# Patient Record
Sex: FEMALE | Race: WHITE | NOT HISPANIC OR LATINO | Employment: PART TIME | URBAN - METROPOLITAN AREA
[De-identification: names, ages, dates, MRNs, and addresses within clinical notes are randomized per-mention and may not be internally consistent; named-entity substitution may affect disease eponyms.]

---

## 2021-02-19 LAB
EXTERNAL HIV CONFIRMATION: NORMAL
EXTERNAL HIV SCREEN: NORMAL
HCV AB SER-ACNC: NEGATIVE

## 2021-02-23 ENCOUNTER — TRANSCRIBE ORDERS (OUTPATIENT)
Dept: PERINATAL CARE | Facility: CLINIC | Age: 21
End: 2021-02-23

## 2021-02-23 DIAGNOSIS — O09.899 SUPERVISION OF OTHER HIGH RISK PREGNANCIES, UNSPECIFIED TRIMESTER: Primary | ICD-10-CM

## 2021-03-26 RX ORDER — LEVOTHYROXINE SODIUM 0.03 MG/1
13 TABLET ORAL DAILY
COMMUNITY

## 2021-03-26 RX ORDER — ONDANSETRON 4 MG/1
4 TABLET, FILM COATED ORAL EVERY 8 HOURS PRN
COMMUNITY
End: 2021-08-30

## 2021-03-31 ENCOUNTER — ROUTINE PRENATAL (OUTPATIENT)
Dept: PERINATAL CARE | Facility: CLINIC | Age: 21
End: 2021-03-31
Payer: COMMERCIAL

## 2021-03-31 VITALS
HEART RATE: 97 BPM | BODY MASS INDEX: 19.21 KG/M2 | SYSTOLIC BLOOD PRESSURE: 117 MMHG | DIASTOLIC BLOOD PRESSURE: 73 MMHG | HEIGHT: 62 IN | WEIGHT: 104.4 LBS

## 2021-03-31 DIAGNOSIS — Z36.82 NUCHAL TRANSLUCENCY OF FETUS ON PRENATAL ULTRASOUND: Primary | ICD-10-CM

## 2021-03-31 DIAGNOSIS — O99.611 GASTROESOPHAGEAL REFLUX IN PREGNANCY IN FIRST TRIMESTER: ICD-10-CM

## 2021-03-31 DIAGNOSIS — K21.9 GASTROESOPHAGEAL REFLUX IN PREGNANCY IN FIRST TRIMESTER: ICD-10-CM

## 2021-03-31 DIAGNOSIS — E03.8 SUBCLINICAL HYPOTHYROIDISM: ICD-10-CM

## 2021-03-31 DIAGNOSIS — Z3A.11 11 WEEKS GESTATION OF PREGNANCY: ICD-10-CM

## 2021-03-31 DIAGNOSIS — O09.899 SUPERVISION OF OTHER HIGH RISK PREGNANCIES, UNSPECIFIED TRIMESTER: ICD-10-CM

## 2021-03-31 PROBLEM — E03.9 HYPOTHYROIDISM: Status: ACTIVE | Noted: 2021-03-31

## 2021-03-31 PROBLEM — E28.2 POLYCYSTIC OVARIES: Status: ACTIVE | Noted: 2021-03-31

## 2021-03-31 PROCEDURE — 76813 OB US NUCHAL MEAS 1 GEST: CPT | Performed by: OBSTETRICS & GYNECOLOGY

## 2021-03-31 PROCEDURE — 99241 PR OFFICE CONSULTATION NEW/ESTAB PATIENT 15 MIN: CPT | Performed by: OBSTETRICS & GYNECOLOGY

## 2021-03-31 RX ORDER — FAMOTIDINE 20 MG/1
20 TABLET, FILM COATED ORAL 2 TIMES DAILY
Qty: 60 TABLET | Refills: 6 | Status: SHIPPED | OUTPATIENT
Start: 2021-03-31 | End: 2021-08-30

## 2021-03-31 NOTE — LETTER
2021     Tammi Olmedo, [address]  [address]  [address] [address] [address]    Patient: Emelyn Ryan   YOB: 2000   Date of Visit: 3/31/2021       Dear Dr Daniel Snow: Thank you for referring Emelyn Ryan to me for evaluation  Below are my notes for this consultation  If you have questions, please do not hesitate to call me  I look forward to following your patient along with you  Sincerely,        Laney Jimenez MD        CC: No Recipients  Laney Jimenez MD  3/31/2021  7:21 PM  Sign when Signing Visit  OFFICE CONSULT  Referring physician:   Tammi Olmedo, [address] [place] [address]  [address],  [address]      Dear Tammi Olmedo      Thank you for requesting a  consultation on your patient Ms Emelyn Ryan for the following indications:  Genetic screening    History  Medications: Levothyroxine 25 micro grams daily, Zofran 4 milligrams p r n , prenatal vitamins daily  Allergies to medications:  Sulfa antibiotics- she reports this because her brother has a severe allergy so she has stayed away from it  Past medical history:  Maternal depression controlled with intermittent counseling with a therapist, PCOS, subclinical hypothyroidism diagnosed by PCP in very early pregnancy  TSH 4 8  with normal free T4 of 1 48 on 21  Symptoms of reflux that are causing significant nausea and at times vomiting   Past surgical history:  [place] tooth extraction  Past obstetrical history:   1 para 0  Social history:  Denies use of cigarettes, alcohol or illicit drugs  First generation family history:  History of hypothyroidism in her mom her dad, 2 sisters and a brother  Her sister has a son with Down syndrome  Ultrasound findings: The ultrasound shows a fetus concordant with dates  The nasal bone and nuchal translucency appears normal  No malformations are seen on today's early ultrasound       The patient was informed of the findings and counseled about the limitations of the exam in detecting all forms of fetal congenital abnormalities  She does not report any vaginal bleeding or uterine cramping or contractions  Specific counseling was provided on the following problems:  1  We discussed the options for genetic screening which include invasive testing on the fetal placenta or on fetal skin cells within the amniotic fluid and compared this to noninvasive testing which includes cell free DNA screening and the sequential screen and quad screen  We reviewed the risks, the benefits and the limitations of each  In the end patient patient took her pamphlets home for both the sequential screen and cell free DNA screening and will talk with her  to decide further if they want any fetal testing  She is aware that she has 2 weeks to make a decision about the sequential screen based on the fetal size today  The quad screen can be done between 15 weeks and 21 weeks and 6 days and the cell free DNA screen can be done any time  2   In the first trimester, the normal range for TSH level is 0 1 to 2 5 mIU/L; this level increases to 0 2 to 3 0 mIU/L in the second trimester and 0 3 to 3 0 mIU/L in the third trimester  Her TSH levels should be rechecked with each trimester and then her meds titrated to keep her TSH in the normal range for pregnancy  Concurrent use of iron or calcium with her thyroid meds may result in decreased effectiveness of her meds  3  Recommend she start Pepcid 20 milligrams twice a day  Her 1st dose can be taken 30 minutes before dinner  Her 2nd dose is normally taken 30 minutes before breakfast but this will affect her levothyroxine dose so instead I would take it at approximately 10-11 am  Her symptoms she states are worse overnight and 1st thing in the morning so she may only a before dinner dose    4  Patient reports she thinks her due date is October 10th which would be by her US done on 2/17/21 based on a gestational sac  Recommend using instead her last menstrual period which is concordant with her 1st ultrasound in which the fetus was seen which occurred on 03/08 2021 and gives the same due date as her LMP  She reports that despite her diagnosis of PCOS that for the last 6 months she has had regular 28 day cycles  Future ultrasounds ordered today:   1  Fetal Level II ultrasound imaging is recommended at 19-20 weeks' gestation  If normal a follow-up ultrasound at 32 weeks will be scheduled        The face to face time, in addition to time spent discussing ultrasound results, was approximately 15 minutes, greater than 50% of which was spent during counseling and coordination of care    Sherrie Coleman MD Midline

## 2021-03-31 NOTE — PROGRESS NOTES
OFFICE CONSULT  Referring physician:   Jarrell Spencer, [address] [place] [address]  [address]  [place],  [address]      Dear Jarrell Spencer      Thank you for requesting a  consultation on your patient Ms Uri Dickson for the following indications:  Genetic screening    History  Medications: Levothyroxine 25 micro grams daily, Zofran 4 milligrams p r n , prenatal vitamins daily  Allergies to medications:  Sulfa antibiotics- she reports this because her brother has a severe allergy so she has stayed away from it  Past medical history:  Maternal depression controlled with intermittent counseling with a therapist, PCOS, subclinical hypothyroidism diagnosed by PCP in very early pregnancy  TSH 4 8  with normal free T4 of 1 48 on 21  Symptoms of reflux that are causing significant nausea and at times vomiting   Past surgical history:  [place] tooth extraction  Past obstetrical history:   1 para 0  Social history:  Denies use of cigarettes, alcohol or illicit drugs  First generation family history:  History of hypothyroidism in her mom her dad, 2 sisters and a brother  Her sister has a son with Down syndrome  Ultrasound findings: The ultrasound shows a fetus concordant with dates  The nasal bone and nuchal translucency appears normal  No malformations are seen on today's early ultrasound  The patient was informed of the findings and counseled about the limitations of the exam in detecting all forms of fetal congenital abnormalities  She does not report any vaginal bleeding or uterine cramping or contractions  Specific counseling was provided on the following problems:  1  We discussed the options for genetic screening which include invasive testing on the fetal placenta or on fetal skin cells within the amniotic fluid and compared this to noninvasive testing which includes cell free DNA screening and the sequential screen and quad screen    We reviewed the risks, the benefits and the limitations of each  In the end patient patient took her pamphlets home for both the sequential screen and cell free DNA screening and will talk with her  to decide further if they want any fetal testing  She is aware that she has 2 weeks to make a decision about the sequential screen based on the fetal size today  The quad screen can be done between 15 weeks and 21 weeks and 6 days and the cell free DNA screen can be done any time  2   In the first trimester, the normal range for TSH level is 0 1 to 2 5 mIU/L; this level increases to 0 2 to 3 0 mIU/L in the second trimester and 0 3 to 3 0 mIU/L in the third trimester  Her TSH levels should be rechecked with each trimester and then her meds titrated to keep her TSH in the normal range for pregnancy  Concurrent use of iron or calcium with her thyroid meds may result in decreased effectiveness of her meds  3  Recommend she start Pepcid 20 milligrams twice a day  Her 1st dose can be taken 30 minutes before dinner  Her 2nd dose is normally taken 30 minutes before breakfast but this will affect her levothyroxine dose so instead I would take it at approximately 10-11 am  Her symptoms she states are worse overnight and 1st thing in the morning so she may only a before dinner dose  4  Patient reports she thinks her due date is October 10th which would be by her US done on 2/17/21 based on a gestational sac  Recommend using instead her last menstrual period which is concordant with her 1st ultrasound in which the fetus was seen which occurred on 03/08 2021 and gives the same due date as her LMP  She reports that despite her diagnosis of PCOS that for the last 6 months she has had regular 28 day cycles  Future ultrasounds ordered today:   1  Fetal Level II ultrasound imaging is recommended at 19-20 weeks' gestation  If normal a follow-up ultrasound at 32 weeks will be scheduled        The face to face time, in addition to time spent discussing ultrasound results, was approximately 15 minutes, greater than 50% of which was spent during counseling and coordination of care    Yolanda Wright MD      Addendum:  Based on my review of the information below,  I would encourage vaccination to prevent her from developing a severe COVID virus infection and the resultant maternal and fetal complications that can arise if she develops a severe infection  Maternal vaccination may also supply antibodies to prevent a  infection  CDCs summary on the COVID vaccination in pregnancy and breast feeding: [employer]        [employer]     Yolanda Wright MD

## 2021-03-31 NOTE — LETTER
2021     Vaishali Juan, [address]  [address]  [address] [address] [address]    Patient: Rj Carmichael   YOB: 2000   Date of Visit: 3/31/2021       Dear Dr Nunez Na: Thank you for referring Rj Carmichael to me for evaluation  Below are my notes for this consultation  If you have questions, please do not hesitate to call me  I look forward to following your patient along with you  Sincerely,        Cynthia Sutton MD        CC: Jayden Cabello MD  3/31/2021  7:26 PM  Addendum  OFFICE CONSULT  Referring physician:   Vaishali Martinez, [address] [place] [address]  [address]  [place],  [address]      Dear Vaishali Martinez      Thank you for requesting a  consultation on your patient Ms Rj Carmichael for the following indications:  Genetic screening    History  Medications: Levothyroxine 25 micro grams daily, Zofran 4 milligrams p r n , prenatal vitamins daily  Allergies to medications:  Sulfa antibiotics- she reports this because her brother has a severe allergy so she has stayed away from it  Past medical history:  Maternal depression controlled with intermittent counseling with a therapist, PCOS, subclinical hypothyroidism diagnosed by PCP in very early pregnancy  TSH 4 8  with normal free T4 of 1 48 on 21  Symptoms of reflux that are causing significant nausea and at times vomiting   Past surgical history:  [place] tooth extraction  Past obstetrical history:   1 para 0  Social history:  Denies use of cigarettes, alcohol or illicit drugs  First generation family history:  History of hypothyroidism in her mom her dad, 2 sisters and a brother  Her sister has a son with Down syndrome  Ultrasound findings: The ultrasound shows a fetus concordant with dates  The nasal bone and nuchal translucency appears normal  No malformations are seen on today's early ultrasound       The patient was informed of the findings and counseled about the limitations of the exam in detecting all forms of fetal congenital abnormalities  She does not report any vaginal bleeding or uterine cramping or contractions  Specific counseling was provided on the following problems:  1  We discussed the options for genetic screening which include invasive testing on the fetal placenta or on fetal skin cells within the amniotic fluid and compared this to noninvasive testing which includes cell free DNA screening and the sequential screen and quad screen  We reviewed the risks, the benefits and the limitations of each  In the end patient patient took her pamphlets home for both the sequential screen and cell free DNA screening and will talk with her  to decide further if they want any fetal testing  She is aware that she has 2 weeks to make a decision about the sequential screen based on the fetal size today  The quad screen can be done between 15 weeks and 21 weeks and 6 days and the cell free DNA screen can be done any time  2   In the first trimester, the normal range for TSH level is 0 1 to 2 5 mIU/L; this level increases to 0 2 to 3 0 mIU/L in the second trimester and 0 3 to 3 0 mIU/L in the third trimester  Her TSH levels should be rechecked with each trimester and then her meds titrated to keep her TSH in the normal range for pregnancy  Concurrent use of iron or calcium with her thyroid meds may result in decreased effectiveness of her meds  3  Recommend she start Pepcid 20 milligrams twice a day  Her 1st dose can be taken 30 minutes before dinner  Her 2nd dose is normally taken 30 minutes before breakfast but this will affect her levothyroxine dose so instead I would take it at approximately 10-11 am  Her symptoms she states are worse overnight and 1st thing in the morning so she may only a before dinner dose    4  Patient reports she thinks her due date is October 10th which would be by her US done on 21 based on a gestational sac  Recommend using instead her last menstrual period which is concordant with her 1st ultrasound in which the fetus was seen which occurred on 2021 and gives the same due date as her LMP  She reports that despite her diagnosis of PCOS that for the last 6 months she has had regular 28 day cycles  Future ultrasounds ordered today:   1  Fetal Level II ultrasound imaging is recommended at 19-20 weeks' gestation  If normal a follow-up ultrasound at 32 weeks will be scheduled  The face to face time, in addition to time spent discussing ultrasound results, was approximately 15 minutes, greater than 50% of which was spent during counseling and coordination of care    Pascual Begum MD      Addendum:  Based on my review of the information below,  I would encourage vaccination to prevent her from developing a severe COVID virus infection and the resultant maternal and fetal complications that can arise if she develops a severe infection  Maternal vaccination may also supply antibodies to prevent a  infection  CDCs summary on the COVID vaccination in pregnancy and breast feeding: [employer]        [employer] robin Begum MD

## 2021-06-08 ENCOUNTER — ROUTINE PRENATAL (OUTPATIENT)
Dept: PERINATAL CARE | Facility: OTHER | Age: 21
End: 2021-06-08
Payer: COMMERCIAL

## 2021-06-08 VITALS
SYSTOLIC BLOOD PRESSURE: 124 MMHG | DIASTOLIC BLOOD PRESSURE: 70 MMHG | HEIGHT: 62 IN | WEIGHT: 116 LBS | HEART RATE: 92 BPM | BODY MASS INDEX: 21.35 KG/M2

## 2021-06-08 DIAGNOSIS — Z36.3 ENCOUNTER FOR ANTENATAL SCREENING FOR MALFORMATION: Primary | ICD-10-CM

## 2021-06-08 DIAGNOSIS — E03.8 SUBCLINICAL HYPOTHYROIDISM: ICD-10-CM

## 2021-06-08 DIAGNOSIS — Z3A.21 21 WEEKS GESTATION OF PREGNANCY: ICD-10-CM

## 2021-06-08 DIAGNOSIS — O99.612 GASTROESOPHAGEAL REFLUX DURING PREGNANCY IN SECOND TRIMESTER, ANTEPARTUM: ICD-10-CM

## 2021-06-08 DIAGNOSIS — Z36.86 ENCOUNTER FOR ANTENATAL SCREENING FOR CERVICAL LENGTH: ICD-10-CM

## 2021-06-08 DIAGNOSIS — K21.9 GASTROESOPHAGEAL REFLUX DURING PREGNANCY IN SECOND TRIMESTER, ANTEPARTUM: ICD-10-CM

## 2021-06-08 PROCEDURE — 76817 TRANSVAGINAL US OBSTETRIC: CPT | Performed by: OBSTETRICS & GYNECOLOGY

## 2021-06-08 PROCEDURE — 76805 OB US >/= 14 WKS SNGL FETUS: CPT | Performed by: OBSTETRICS & GYNECOLOGY

## 2021-06-08 PROCEDURE — 99213 OFFICE O/P EST LOW 20 MIN: CPT | Performed by: OBSTETRICS & GYNECOLOGY

## 2021-06-08 NOTE — LETTER
June 8, 2021     Cecile Ball, [address] [address]    Patient: Bernadette Toney   YOB: 2000   Date of Visit: 6/8/2021       Dear Dr Debra Read: Thank you for referring Bernadette Toney to me for evaluation  Below are my notes for this consultation  If you have questions, please do not hesitate to call me  I look forward to following your patient along with you  Sincerely,        Amando Rdz MD        CC: No Recipients  Amando Rdz MD  6/8/2021  1:11 PM  Sign when Signing Visit  Bernadette Toney  has no complaints today at 21w5d  She reports fetal movements and does not report any vaginal bleeding or signs of labor  She declined any genetic screening  She has subclinical hypothyroidism currently on levothyroxine 25 micro grams daily with her last TSH of 1 78 on 4/5/21  Since her  Last ultrasound when she was present for nuchal translucency screening her nausea has improved and she is using Pepcid now as needed  Ultrasound findings: The ultrasound today shows normal interval fetal growth and fluid, normal cervical length, and no malformations were detected  Pregnancy ultrasound has limitations and is unable to detect all forms of fetal congenital abnormalities  Follow up recommended:   1  Recommend a follow-up ultrasound for fetal growth at 32 weeks secondary to her subclinical hypothyroidism  Pre visit time reviewing her records   5 minutes  Face to face time 10 minutes  Post visit time on documentation of note, updating her problem list, adding orders and prescriptions 5 minutes  Procedures that were completed today were charged separately  The level of decision making was low level complexity      Amando Rdz MD

## 2021-06-08 NOTE — PROGRESS NOTES
Carmen Spray  has no complaints today at 21w5d  She reports fetal movements and does not report any vaginal bleeding or signs of labor  She declined any genetic screening  She has subclinical hypothyroidism currently on levothyroxine 25 micro grams daily with her last TSH of 1 78 on 4/5/21  Since her  Last ultrasound when she was present for nuchal translucency screening her nausea has improved and she is using Pepcid now as needed  Ultrasound findings: The ultrasound today shows normal interval fetal growth and fluid, normal cervical length, and no malformations were detected  Pregnancy ultrasound has limitations and is unable to detect all forms of fetal congenital abnormalities  Follow up recommended:   1  Recommend a follow-up ultrasound for fetal growth at 32 weeks secondary to her subclinical hypothyroidism  Pre visit time reviewing her records   5 minutes  Face to face time 10 minutes  Post visit time on documentation of note, updating her problem list, adding orders and prescriptions 5 minutes  Procedures that were completed today were charged separately  The level of decision making was low level complexity      Keven Escobar MD

## 2021-08-30 ENCOUNTER — ULTRASOUND (OUTPATIENT)
Dept: PERINATAL CARE | Facility: CLINIC | Age: 21
End: 2021-08-30
Payer: COMMERCIAL

## 2021-08-30 VITALS
DIASTOLIC BLOOD PRESSURE: 73 MMHG | BODY MASS INDEX: 24.84 KG/M2 | HEART RATE: 85 BPM | HEIGHT: 62 IN | SYSTOLIC BLOOD PRESSURE: 111 MMHG | WEIGHT: 135 LBS

## 2021-08-30 DIAGNOSIS — O36.63X0 FETAL MACROSOMIA IN THIRD TRIMESTER, SINGLE OR UNSPECIFIED FETUS: Primary | ICD-10-CM

## 2021-08-30 DIAGNOSIS — E03.8 SUBCLINICAL HYPOTHYROIDISM: ICD-10-CM

## 2021-08-30 DIAGNOSIS — Z36.89 ENCOUNTER FOR ULTRASOUND TO CHECK FETAL GROWTH: ICD-10-CM

## 2021-08-30 PROCEDURE — 76816 OB US FOLLOW-UP PER FETUS: CPT | Performed by: OBSTETRICS & GYNECOLOGY

## 2021-08-30 PROCEDURE — 99212 OFFICE O/P EST SF 10 MIN: CPT | Performed by: OBSTETRICS & GYNECOLOGY

## 2021-08-30 RX ORDER — FERROUS SULFATE 325(65) MG
325 TABLET ORAL
COMMUNITY

## 2021-08-30 NOTE — PROGRESS NOTES
Mihai Ireland: Ms Judith Watkins was seen today for fetal growth assessment ultrasound  See ultrasound report under "OB Procedures" tab  The time spent on this established patient on the encounter date included 4 minutes previsit service time reviewing records and precharting, 5 minutes face-to-face service time counseling regarding results and coordinating care, and  4 minutes charting, totalling 13 minutes  Please don't hesitate to contact our office with any concerns or questions    Cynthia Castañeda MD

## 2021-08-30 NOTE — LETTER
2021    Lavon Prado, [address] [address]    Patient: Rebecca Rendon   YOB: 2000   Date of Visit: 2021   Gestational age 26w1d   Iron Price of this communication: Routine though please let us know if you want us to assess growth again (given macrosomia today)   Who saw her last from your group: Vicky Brooks   Who is seeing her next from your group: Dr Arsen Curran       Dear Marily Clifford,    This patient was seen recently in our  office  The content of my evaluation today is in the ultrasound report under "OB Procedures" tab  Consultation is contained in body of ultrasound report which has been faxed to you under separate cover; please contact us if you do not receive this  Please don't hesitate to contact our office with any concerns or questions       Sincerely,      Haroon Clark MD

## 2021-08-30 NOTE — LETTER
2021    Simon Sanderson, [address] [address]  [address] [facility] [address]    Patient: Alex Worrell   YOB: 2000   Date of Visit: 2021   Gestational age 26w1d   Perez Arnold of this communication: Routine though please note suspected macrosomia     Dear Gertrude Huerta,    This patient was seen recently in our  office  The content of my evaluation today is in the ultrasound report under "OB Procedures" tab  Consultation is contained in body of ultrasound report which has been faxed to you under separate cover; please contact us if you do not receive this  Please don't hesitate to contact our office with any concerns or questions       Sincerely,      Rohit Moses MD

## 2021-08-30 NOTE — PATIENT INSTRUCTIONS
Thank you for choosing us for your  care today  If you have any questions about your ultrasound or care, please do not hesitate to contact us or your primary obstetrician  Some general instructions for your pregnancy are:     Protect against coronavirus: get vaccinated and mask up  Pregnant women are increased risk of severe COVID  Notify your primary care doctor if you have any symptoms including cough, shortness of breath or difficulty breathing, fever, chills, muscle pain, sore throat, or loss of taste or smell   Exercise: Aim for 22 minutes per day (150 minutes per week) of regular exercise  Walking is great!  Nutrition: aim for calcium-rich and iron-rich foods as well as healthy sources of protein   Learn about Preeclampsia: preeclampsia is a common, serious high blood pressure complication in pregnancy  A blood pressure of 835DBUF (systolic or top number) or 31UVHE (diastolic or bottom number) is not normal and needs evaluation by your doctor  Aspirin is sometimes prescribed in early pregnancy to prevent preeclampsia in women with risk factors - ask your obstetrician if you should be on this medication   If you smoke, try to reduce how many cigarettes you smoke or try to quit completely  Do not vape   Other warning signs to watch out for in pregnancy or postpartum: chest pain, obstructed breathing or shortness of breath, seizures, thoughts of hurting yourself or your baby, bleeding, a painful or swollen leg, fever, or headache (see [employer] POST-BIRTH Warning Signs campaign)  If these happen call 911  Itching is also not normal in pregnancy and if you experience this, especially over your hands and feet, potentially worse at night, notify your doctors